# Patient Record
(demographics unavailable — no encounter records)

---

## 2024-11-16 NOTE — HEALTH RISK ASSESSMENT
[0] : 2) Feeling down, depressed, or hopeless: Not at all (0) [PHQ-2 Negative - No further assessment needed] : PHQ-2 Negative - No further assessment needed [Never] : Never [[id number]] : 0

## 2024-11-16 NOTE — HEALTH RISK ASSESSMENT
[0] : 2) Feeling down, depressed, or hopeless: Not at all (0) [PHQ-2 Negative - No further assessment needed] : PHQ-2 Negative - No further assessment needed [Never] : Never [[id number]] : 0

## 2024-11-16 NOTE — PHYSICAL EXAM
[No Acute Distress] : no acute distress [Well Nourished] : well nourished [Well Developed] : well developed [Well-Appearing] : well-appearing [Normal Voice/Communication] : normal voice/communication [Normal Sclera/Conjunctiva] : normal sclera/conjunctiva [PERRL] : pupils equal round and reactive to light [EOMI] : extraocular movements intact [No JVD] : no jugular venous distention [No Lymphadenopathy] : no lymphadenopathy [No Respiratory Distress] : no respiratory distress  [No Accessory Muscle Use] : no accessory muscle use [Clear to Auscultation] : lungs were clear to auscultation bilaterally [Normal Percussion] : the chest was normal to percussion [Normal Rate] : normal rate  [Regular Rhythm] : with a regular rhythm [Normal S1, S2] : normal S1 and S2 [No Murmur] : no murmur heard [No Carotid Bruits] : no carotid bruits [Pedal Pulses Present] : the pedal pulses are present [No Edema] : there was no peripheral edema [Soft] : abdomen soft [Non Tender] : non-tender [No HSM] : no HSM [Normal Bowel Sounds] : normal bowel sounds [Normal Axillary Nodes] : no axillary lymphadenopathy [Normal Posterior Cervical Nodes] : no posterior cervical lymphadenopathy [Normal Anterior Cervical Nodes] : no anterior cervical lymphadenopathy [Normal Inguinal Nodes] : no inguinal lymphadenopathy [No CVA Tenderness] : no CVA  tenderness [No Spinal Tenderness] : no spinal tenderness [No Rash] : no rash [de-identified] : No subconjunctival hemorrhages noted [de-identified] : (+)bilateral posterior cervical muscular tenderness/spasm with decreased range of motion, (-)Adson/Spurling maneuver [de-identified] : (+)bilateral upper dorsal paraspinal muscular spasm/tenderness with decreased range of motion, no CVA or spinal tenderness

## 2024-11-16 NOTE — REVIEW OF SYSTEMS
[Fever] : fever [Chills] : chills [Fatigue] : fatigue [Wheezing] : wheezing [Cough] : cough [Muscle Pain] : muscle pain [Back Pain] : back pain [Negative] : Heme/Lymph [Recent Change In Weight] : ~T no recent weight change [Chest Pain] : no chest pain [Shortness Of Breath] : no shortness of breath [Dyspnea on Exertion] : no dyspnea on exertion [Abdominal Pain] : no abdominal pain [Swollen Glands] : no swollen glands

## 2024-11-16 NOTE — PHYSICAL EXAM
[No Acute Distress] : no acute distress [Well Nourished] : well nourished [Well Developed] : well developed [Well-Appearing] : well-appearing [Normal Voice/Communication] : normal voice/communication [Normal Sclera/Conjunctiva] : normal sclera/conjunctiva [PERRL] : pupils equal round and reactive to light [EOMI] : extraocular movements intact [No JVD] : no jugular venous distention [No Lymphadenopathy] : no lymphadenopathy [No Respiratory Distress] : no respiratory distress  [No Accessory Muscle Use] : no accessory muscle use [Clear to Auscultation] : lungs were clear to auscultation bilaterally [Normal Percussion] : the chest was normal to percussion [Normal Rate] : normal rate  [Regular Rhythm] : with a regular rhythm [Normal S1, S2] : normal S1 and S2 [No Murmur] : no murmur heard [No Carotid Bruits] : no carotid bruits [Pedal Pulses Present] : the pedal pulses are present [No Edema] : there was no peripheral edema [Soft] : abdomen soft [Non Tender] : non-tender [No HSM] : no HSM [Normal Bowel Sounds] : normal bowel sounds [Normal Axillary Nodes] : no axillary lymphadenopathy [Normal Posterior Cervical Nodes] : no posterior cervical lymphadenopathy [Normal Anterior Cervical Nodes] : no anterior cervical lymphadenopathy [Normal Inguinal Nodes] : no inguinal lymphadenopathy [No CVA Tenderness] : no CVA  tenderness [No Spinal Tenderness] : no spinal tenderness [No Rash] : no rash [de-identified] : No subconjunctival hemorrhages noted [de-identified] : (+)bilateral posterior cervical muscular tenderness/spasm with decreased range of motion, (-)Adson/Spurling maneuver [de-identified] : (+)bilateral upper dorsal paraspinal muscular spasm/tenderness with decreased range of motion, no CVA or spinal tenderness

## 2024-11-16 NOTE — HISTORY OF PRESENT ILLNESS
[FreeTextEntry1] : Pneumonia follow-up, and neck/upper back pain. [de-identified] : 83-year-old  female diagnosed with CAP 10 days ago, treated with azithromycin/Augmentin, and just finished the last dose of antibiotics 2 days ago. Reports posterior cervical and upper back discomfort which patient feels are related to the severity of coughing which was present at the height of the illness. No radicular symptoms. Taking Tylenol only with mild improvement in symptoms. Needs influenza immunization.

## 2024-11-16 NOTE — HISTORY OF PRESENT ILLNESS
[FreeTextEntry1] : Pneumonia follow-up, and neck/upper back pain. [de-identified] : 83-year-old  female diagnosed with CAP 10 days ago, treated with azithromycin/Augmentin, and just finished the last dose of antibiotics 2 days ago. Reports posterior cervical and upper back discomfort which patient feels are related to the severity of coughing which was present at the height of the illness. No radicular symptoms. Taking Tylenol only with mild improvement in symptoms. Needs influenza immunization.